# Patient Record
Sex: MALE | Race: WHITE | NOT HISPANIC OR LATINO
[De-identification: names, ages, dates, MRNs, and addresses within clinical notes are randomized per-mention and may not be internally consistent; named-entity substitution may affect disease eponyms.]

---

## 2017-06-15 ENCOUNTER — APPOINTMENT (OUTPATIENT)
Dept: INTERNAL MEDICINE | Facility: CLINIC | Age: 52
End: 2017-06-15

## 2017-06-15 VITALS
DIASTOLIC BLOOD PRESSURE: 78 MMHG | BODY MASS INDEX: 36.18 KG/M2 | RESPIRATION RATE: 18 BRPM | WEIGHT: 244.3 LBS | HEART RATE: 84 BPM | SYSTOLIC BLOOD PRESSURE: 114 MMHG | TEMPERATURE: 98.9 F | OXYGEN SATURATION: 97 % | HEIGHT: 69 IN

## 2017-06-15 DIAGNOSIS — Z00.00 ENCOUNTER FOR GENERAL ADULT MEDICAL EXAMINATION W/OUT ABNORMAL FINDINGS: ICD-10-CM

## 2017-06-15 DIAGNOSIS — R20.2 PARESTHESIA OF SKIN: ICD-10-CM

## 2019-08-19 ENCOUNTER — TRANSCRIPTION ENCOUNTER (OUTPATIENT)
Age: 54
End: 2019-08-19

## 2021-10-22 ENCOUNTER — EMERGENCY (EMERGENCY)
Facility: HOSPITAL | Age: 56
LOS: 1 days | Discharge: HOME | End: 2021-10-22
Attending: INTERNAL MEDICINE | Admitting: INTERNAL MEDICINE
Payer: MEDICARE

## 2021-10-22 VITALS
HEART RATE: 88 BPM | HEIGHT: 69.5 IN | SYSTOLIC BLOOD PRESSURE: 123 MMHG | TEMPERATURE: 98 F | WEIGHT: 210.1 LBS | DIASTOLIC BLOOD PRESSURE: 77 MMHG | OXYGEN SATURATION: 97 % | RESPIRATION RATE: 16 BRPM

## 2021-10-22 PROCEDURE — 90471 IMMUNIZATION ADMIN: CPT

## 2021-10-22 PROCEDURE — 99283 EMERGENCY DEPT VISIT LOW MDM: CPT | Mod: 25

## 2021-10-22 PROCEDURE — 99283 EMERGENCY DEPT VISIT LOW MDM: CPT

## 2021-10-22 PROCEDURE — 90715 TDAP VACCINE 7 YRS/> IM: CPT

## 2021-10-22 RX ORDER — TETANUS TOXOID, REDUCED DIPHTHERIA TOXOID AND ACELLULAR PERTUSSIS VACCINE, ADSORBED 5; 2.5; 8; 8; 2.5 [IU]/.5ML; [IU]/.5ML; UG/.5ML; UG/.5ML; UG/.5ML
0.5 SUSPENSION INTRAMUSCULAR ONCE
Refills: 0 | Status: COMPLETED | OUTPATIENT
Start: 2021-10-22 | End: 2021-10-22

## 2021-10-22 RX ADMIN — TETANUS TOXOID, REDUCED DIPHTHERIA TOXOID AND ACELLULAR PERTUSSIS VACCINE, ADSORBED 0.5 MILLILITER(S): 5; 2.5; 8; 8; 2.5 SUSPENSION INTRAMUSCULAR at 18:03

## 2021-10-22 RX ADMIN — Medication 1 TABLET(S): at 18:04

## 2021-10-22 NOTE — ED PROVIDER NOTE - PATIENT PORTAL LINK FT
You can access the FollowMyHealth Patient Portal offered by [facility] by registering at the following website: [email or web address]. By joining [facility]’s FollowMyHealth portal, you will also be able to view your health information using other applications (apps) compatible with our system.

## 2021-10-22 NOTE — ED PROVIDER NOTE - CLINICAL SUMMARY MEDICAL DECISION MAKING FREE TEXT BOX
56 yr old male with hx of crohn's ds, hypothyroidism presents to the ED c/o dog bite to left 2nd finger. known dog. unknown last tetanus. No other injuries.   Right hand dominant.   + abrasion noted to distal  left 2nd finger, superficial, positive ROM, not bleeding   tetanus given. Augmentin given, wound care and stable for dc

## 2021-10-22 NOTE — ED PROVIDER NOTE - DISPOSITION TYPE
Care Gap Team has outreached to [facility] on behalf of their primary care provider.      Care Gap Source:: [employer]                        Chart Review Completed:: Yes                               Pt is showing as being due for an A1c. I have reviewed the pt chart, and noticed that the pt had an A1c completed on 7/30/2019. Care gap has been satisfied.                      DISCHARGE

## 2021-10-22 NOTE — ED PROVIDER NOTE - OBJECTIVE STATEMENT
56 yr old male with hx of crohn's ds, hypothyroidism presents to the ED c/o dog bite to left 2nd finger. known dog. unknown last tetanus. No other injuries.   Right hand dominant.

## 2021-10-22 NOTE — ED ADULT NURSE NOTE - OBJECTIVE STATEMENT
was bit or scratched by a dog. reports it is most likely a neighbors dog, declined rabies vaccine til he confirms vaccine status. Superficial cut to tip of finger left index finger. Bleeding controlled.

## 2021-10-22 NOTE — ED PROVIDER NOTE - ATTENDING CONTRIBUTION TO CARE
56 yr old male with hx of crohn's ds, hypothyroidism presents to the ED c/o dog bite to left 2nd finger. known dog. unknown last tetanus. No other injuries.   Right hand dominant.   + abrasion noted to distal  left 2nd finger, superficial, positive ROM, not bleeding   tetanus given. Augmentin given, wound care and stable for dc  Dr. Machado:  I have reviewed and discussed with the PA/ resident the case specifics, including the history, physical assessment, evaluation, conclusion, laboratory results, and medical plan. I agree with the contents, and conclusions. I have personally examined, and interviewed the patient.

## 2021-10-22 NOTE — ED PROVIDER NOTE - NSFOLLOWUPINSTRUCTIONS_ED_ALL_ED_FT
Take augmentin twice daily for the next 7 days.   Take motrin 600mg every 6 hours as needed for pain    Clean area with soap and water twice daily.   You were given tetanus shot in ED, your arm may be sore   Return to the ED if any worsening or persistent symptoms.         Animal Bite    WHAT YOU NEED TO KNOW:    What do I need to know about an animal bite? Animal bite injuries range from shallow cuts to deep, life-threatening wounds. An animal can cut or puncture the skin when it bites. Your skin may be torn from your body. Your skin may swell or bruise even if the bite does not break the skin. Animal bites occur more often on the hands, arms, legs, and face. Bites from dogs and cats are the most common injuries.    What does my healthcare provider need to know about my animal bite?   •What kind of animal bit you? Is the animal a pet? If so, are its vaccines updated?      •When and where did the bite happen? Was the animal bothered by you or another person before it bit? Did the animal show any fear?      •Can the animal be brought in to watch it for sickness or disease?      •Has the wound been treated? If so, what did you use to treat it?       •Do you have any health conditions? Do you currently take any medicines? When was your last tetanus shot?      What tests may I need after an animal bite? Your healthcare provider will look at how big and deep the bite wounds are. He will ask if any area feels numb. Your healthcare provider will check how well you can move the bitten area. He will also check for signs of infection. You may also need the following:   •Blood tests and a sample of fluid or tissue from your wound may show if you have an infection.      •An x-ray may show fractures or foreign objects in your wound.      How is an animal bite treated?   •Irrigation and debridement may be needed to clean out your wound. Dead, damaged, or infected tissue may be cut away to help your wound heal.      •Medicines: ?Antibiotics prevent or treat a bacterial infection.      ?Prescription pain medicine may be given. Ask how to take this medicine safely.      ?A tetanus vaccine may be needed to prevent tetanus. Tetanus is a life-threatening bacterial infection that affects the nerves and muscles. The bacteria can be spread through animal bites.       ?A rabies vaccine may be needed to prevent rabies. Rabies is a life-threatening viral infection. The virus can be spread through animal bites.      •Stitches may be needed if your wound is large and not infected.      •Surgery may be needed to repair deep injuries or severe wounds.      What can I do to manage my symptoms?   •Apply antibiotic ointment as directed. This helps prevent infection in minor skin wounds. It is available without a doctor's order.      •Keep the wound clean and covered. Wash the wound every day with soap and water or germ-killing cleanser. Ask your healthcare provider about the kinds of bandages to use.       •Apply ice on your wound. Ice helps decrease swelling and pain. Ice may also help prevent tissue damage. Use an ice pack, or put crushed ice in a plastic bag. Cover it with a towel and place it on your wound for 15 to 20 minutes every hour or as directed.      •Elevate the wound area. Raise your wound above the level of your heart as often as you can. This will help decrease swelling and pain. Prop your wound on pillows or blankets to keep it elevated comfortably.       What can I do to prevent an animal bite?   •Learn to recognize the signs of a scared pet. Avoid quick, sudden movements.      •Do not step between animals that are fighting.      •Do not leave a pet alone with a young child.      •Do not disturb an animal while it eats, sleeps, or cares for its young.      •Do not approach an animal you do not know, especially one that is tied up or caged.      •Stay away from animals that seem sick or act strangely.      •Do not feed or capture wild animals.       When should I seek immediate care?   •You have a fever.      •Your wound is red, swollen, and draining pus.      •You see red streaks on the skin around the wound.      •You can no longer move the bitten area.      •Your heartbeat and breathing are much faster than usual.      •You feel dizzy and confused.      When should I contact my healthcare provider?   •Your pain does not get better, even after you take pain medicine.      •You have nightmares or flashbacks about the animal bite.       •You have questions or concerns about your condition or care.      CARE AGREEMENT:    You have the right to help plan your care. Learn about your health condition and how it may be treated. Discuss treatment options with your healthcare providers to decide what care you want to receive. You always have the right to refuse treatment.

## 2022-04-25 DIAGNOSIS — H90.3 SENSORINEURAL HEARING LOSS, BILATERAL: ICD-10-CM
